# Patient Record
Sex: MALE | Race: WHITE | NOT HISPANIC OR LATINO | Employment: STUDENT | URBAN - METROPOLITAN AREA
[De-identification: names, ages, dates, MRNs, and addresses within clinical notes are randomized per-mention and may not be internally consistent; named-entity substitution may affect disease eponyms.]

---

## 2023-05-23 PROBLEM — Z15.89 MONOALLELIC MUTATION OF MYBPC3 GENE: Status: ACTIVE | Noted: 2023-05-23

## 2023-10-10 ENCOUNTER — TELEMEDICINE (OUTPATIENT)
Dept: PRIMARY CARE | Facility: CLINIC | Age: 23
End: 2023-10-10

## 2023-10-10 DIAGNOSIS — Z15.89 MONOALLELIC MUTATION OF MYBPC3 GENE: ICD-10-CM

## 2023-10-10 DIAGNOSIS — Z00.00 WELL ADULT EXAM: ICD-10-CM

## 2023-10-10 DIAGNOSIS — F41.9 ANXIETY: Primary | ICD-10-CM

## 2023-10-10 PROCEDURE — 99441 PR PHYS/QHP TELEPHONE EVALUATION 5-10 MIN: CPT | Performed by: FAMILY MEDICINE

## 2023-10-10 RX ORDER — SERTRALINE HYDROCHLORIDE 50 MG/1
50 TABLET, FILM COATED ORAL NIGHTLY
Qty: 90 TABLET | Refills: 3 | Status: SHIPPED | OUTPATIENT
Start: 2023-10-10 | End: 2024-10-09

## 2023-10-10 RX ORDER — SERTRALINE HYDROCHLORIDE 50 MG/1
50 TABLET, FILM COATED ORAL NIGHTLY
COMMUNITY
End: 2023-10-10 | Stop reason: SDUPTHER

## 2023-10-10 NOTE — PROGRESS NOTES
"Subjective   Patient ID: Benton Connell is a 22 y.o. male who presents for telephone only visit ([facility] and has no intenet access).   Anxiety under excellent control with zoloft.  No longer bothered by anxiety -- planning on getting  in spring 2024.     Smoking a ppd.     No cp/sob.       Objective   There were no vitals taken for this visit.   O: Awake, alert, NAD. No intoxication, withdrawal or sedation noted per two-way audio-video feed.    No results found for: \"WBC\", \"HGB\", \"HCT\", \"MCV\", \"PLT\"    Chemistry    No results found for: \"NA\", \"K\", \"CL\", \"CO2\", \"BUN\", \"CREATININE\", \"GLU\" No results found for: \"CALCIUM\", \"ALKPHOS\", \"AST\", \"ALT\", \"BILITOT\"       No results found for: \"CHOL\"  No results found for: \"HDL\"  No results found for: \"LDLCALC\"  No results found for: \"TRIG\"  No components found for: \"CHOLHDL\"   No results found for: \"HGBA1C\"    Assessment/Plan  8 minutes spent on telephone only visit [id number]  Problem List Items Addressed This Visit       Monoallelic mutation of MYBPC3 gene    Overview     One copy detected 3/23/23    Saw Cardiology -- had echocardiogram which was normal.  to follow up as needed.          Anxiety - Primary    Overview     counseled on deep breathing exercises.   Resume zoloft 50 mg once a day.   If not improved completely can refer to counseling, Lillie Gao in [place].            Well adult exam    Overview     10/10/23 Counseled briefly on plan to use nicotine patch 14 mg daily x 2 weeks then cut to 7 mg daily when quitting smoking.  Encouraged use of nicotine lozenge to have on hand in case of emergency cravings.     To be  Spring 2024.            "

## 2024-10-25 ENCOUNTER — APPOINTMENT (OUTPATIENT)
Dept: PRIMARY CARE | Facility: CLINIC | Age: 24
End: 2024-10-25

## 2024-10-25 DIAGNOSIS — F41.9 ANXIETY: ICD-10-CM

## 2024-10-25 DIAGNOSIS — Z15.89 MONOALLELIC MUTATION OF MYBPC3 GENE: Primary | ICD-10-CM

## 2024-10-25 PROCEDURE — 99441 PR PHYS/QHP TELEPHONE EVALUATION 5-10 MIN: CPT | Performed by: FAMILY MEDICINE

## 2024-10-25 RX ORDER — SERTRALINE HYDROCHLORIDE 50 MG/1
50 TABLET, FILM COATED ORAL NIGHTLY
Qty: 90 TABLET | Refills: 3 | Status: SHIPPED | OUTPATIENT
Start: 2024-10-25 | End: 2025-10-25

## 2024-10-25 NOTE — PROGRESS NOTES
"Subjective   Patient ID: Benton Connell is a 24 y.o. male who presents for telephone only visit ([facility] and has no intenet access).   Anxiety under excellent control with zoloft.  No longer bothered by anxiety --  in spring 2024.     Quit smoking except for 1 cig/day.  Using pouches.     No cp/sob.       Objective   There were no vitals taken for this visit.   O: Awake, alert, NAD. No intoxication, withdrawal or sedation noted per two-way audio feed.    No results found for: \"WBC\", \"HGB\", \"HCT\", \"MCV\", \"PLT\"    Chemistry    No results found for: \"NA\", \"K\", \"CL\", \"CO2\", \"BUN\", \"CREATININE\", \"GLU\" No results found for: \"CALCIUM\", \"ALKPHOS\", \"AST\", \"ALT\", \"BILITOT\"       No results found for: \"CHOL\"  No results found for: \"HDL\"  No results found for: \"LDLCALC\"  No results found for: \"TRIG\"  No components found for: \"CHOLHDL\"   No results found for: \"HGBA1C\"    Assessment/Plan  6 minutes spent on telephone only visit [id number]  Problem List Items Addressed This Visit       Monoallelic mutation of MYBPC3 gene - Primary    Overview     One copy detected 3/23/23    Saw Cardiology -- had echocardiogram which was normal.  to follow up as needed.          Anxiety    Overview     counseled on deep breathing exercises.   Continue zoloft 50 mg once a day.                 "

## 2025-10-31 ENCOUNTER — APPOINTMENT (OUTPATIENT)
Dept: PRIMARY CARE | Facility: CLINIC | Age: 25
End: 2025-10-31